# Patient Record
Sex: MALE | Race: WHITE | NOT HISPANIC OR LATINO | Employment: FULL TIME | URBAN - METROPOLITAN AREA
[De-identification: names, ages, dates, MRNs, and addresses within clinical notes are randomized per-mention and may not be internally consistent; named-entity substitution may affect disease eponyms.]

---

## 2025-04-15 ENCOUNTER — APPOINTMENT (OUTPATIENT)
Dept: ULTRASOUND IMAGING | Facility: HOSPITAL | Age: 55
End: 2025-04-15

## 2025-04-15 ENCOUNTER — HOSPITAL ENCOUNTER (EMERGENCY)
Facility: HOSPITAL | Age: 55
Discharge: HOME | End: 2025-04-16
Attending: EMERGENCY MEDICINE

## 2025-04-15 ENCOUNTER — APPOINTMENT (OUTPATIENT)
Dept: CT IMAGING | Facility: HOSPITAL | Age: 55
End: 2025-04-15

## 2025-04-15 VITALS
WEIGHT: 235.01 LBS | RESPIRATION RATE: 16 BRPM | BODY MASS INDEX: 29.22 KG/M2 | HEIGHT: 75 IN | DIASTOLIC BLOOD PRESSURE: 95 MMHG | OXYGEN SATURATION: 97 % | HEART RATE: 85 BPM | TEMPERATURE: 97.7 F | SYSTOLIC BLOOD PRESSURE: 147 MMHG

## 2025-04-15 DIAGNOSIS — L02.416 CELLULITIS AND ABSCESS OF LEFT LEG: Primary | ICD-10-CM

## 2025-04-15 DIAGNOSIS — L03.116 CELLULITIS AND ABSCESS OF LEFT LEG: Primary | ICD-10-CM

## 2025-04-15 LAB
ANION GAP SERPL CALC-SCNC: 8 MMOL/L (ref 3–11)
BASOPHILS # BLD AUTO: 0.1 10*3/UL
BASOPHILS NFR BLD AUTO: 1.1 % (ref 0–2)
BUN SERPL-MCNC: 13 MG/DL (ref 7–25)
CALCIUM SERPL-MCNC: 9.2 MG/DL (ref 8.6–10.3)
CHLORIDE SERPL-SCNC: 101 MMOL/L (ref 98–107)
CO2 SERPL-SCNC: 28 MMOL/L (ref 21–32)
CREAT SERPL-MCNC: 1.11 MG/DL (ref 0.7–1.3)
EGFRCR SERPLBLD CKD-EPI 2021: 77 ML/MIN/1.73M*2
EOSINOPHIL # BLD AUTO: 0.5 10*3/UL
EOSINOPHIL NFR BLD AUTO: 4.6 % (ref 0–3)
ERYTHROCYTE [DISTWIDTH] IN BLOOD BY AUTOMATED COUNT: 13.1 % (ref 11.5–15)
GLUCOSE SERPL-MCNC: 103 MG/DL (ref 70–105)
HCT VFR BLD AUTO: 40.4 % (ref 38–50)
HGB BLD-MCNC: 14.1 G/DL (ref 13.2–17.2)
LYMPHOCYTES # BLD AUTO: 2.4 10*3/UL
LYMPHOCYTES NFR BLD AUTO: 23.9 % (ref 15–47)
MAGNESIUM SERPL-MCNC: 1.9 MG/DL (ref 1.8–2.4)
MCH RBC QN AUTO: 28.9 PG (ref 29–34)
MCHC RBC AUTO-ENTMCNC: 34.9 G/DL (ref 32–36)
MCV RBC AUTO: 82.9 FL (ref 82–97)
MONOCYTES # BLD AUTO: 1.1 10*3/UL
MONOCYTES NFR BLD AUTO: 10.8 % (ref 5–13)
NEUTROPHILS # BLD AUTO: 6 10*3/UL
NEUTROPHILS NFR BLD AUTO: 59.6 % (ref 46–70)
PLATELET # BLD AUTO: 248 10*3/UL (ref 130–350)
PMV BLD AUTO: 7.8 FL (ref 6.9–10.8)
POTASSIUM SERPL-SCNC: 3.4 MMOL/L (ref 3.5–5.1)
RBC # BLD AUTO: 4.87 10*6/UL (ref 4.1–5.8)
SODIUM SERPL-SCNC: 137 MMOL/L (ref 135–145)
WBC # BLD AUTO: 10 10*3/UL (ref 3.7–9.6)

## 2025-04-15 PROCEDURE — 6360000200 HC RX 636 W HCPCS (ALT 250 FOR IP): Performed by: EMERGENCY MEDICINE

## 2025-04-15 PROCEDURE — 2550000100 HC RX 255: Performed by: EMERGENCY MEDICINE

## 2025-04-15 PROCEDURE — 96374 THER/PROPH/DIAG INJ IV PUSH: CPT

## 2025-04-15 PROCEDURE — 85025 COMPLETE CBC W/AUTO DIFF WBC: CPT | Performed by: EMERGENCY MEDICINE

## 2025-04-15 PROCEDURE — 96375 TX/PRO/DX INJ NEW DRUG ADDON: CPT

## 2025-04-15 PROCEDURE — 73701 CT LOWER EXTREMITY W/DYE: CPT | Mod: LT

## 2025-04-15 PROCEDURE — 36415 COLL VENOUS BLD VENIPUNCTURE: CPT | Performed by: EMERGENCY MEDICINE

## 2025-04-15 PROCEDURE — 80048 BASIC METABOLIC PNL TOTAL CA: CPT | Performed by: EMERGENCY MEDICINE

## 2025-04-15 PROCEDURE — 83735 ASSAY OF MAGNESIUM: CPT | Performed by: EMERGENCY MEDICINE

## 2025-04-15 PROCEDURE — 2580000300 HC RX 258: Performed by: EMERGENCY MEDICINE

## 2025-04-15 PROCEDURE — 99283 EMERGENCY DEPT VISIT LOW MDM: CPT | Performed by: EMERGENCY MEDICINE

## 2025-04-15 PROCEDURE — 93971 EXTREMITY STUDY: CPT | Mod: LT

## 2025-04-15 RX ORDER — SODIUM CHLORIDE 9 MG/ML
1000 INJECTION, SOLUTION INTRAVENOUS ONCE
Status: COMPLETED | OUTPATIENT
Start: 2025-04-15 | End: 2025-04-16

## 2025-04-15 RX ORDER — CEFTRIAXONE SODIUM 2 G
2000 VIAL (EA) INJECTION ONCE
Status: COMPLETED | OUTPATIENT
Start: 2025-04-15 | End: 2025-04-15

## 2025-04-15 RX ORDER — IOPAMIDOL 755 MG/ML
65 INJECTION, SOLUTION INTRAVASCULAR ONCE
Status: COMPLETED | OUTPATIENT
Start: 2025-04-15 | End: 2025-04-15

## 2025-04-15 RX ORDER — KETOROLAC TROMETHAMINE 15 MG/ML
15 INJECTION, SOLUTION INTRAMUSCULAR; INTRAVENOUS ONCE
Status: COMPLETED | OUTPATIENT
Start: 2025-04-15 | End: 2025-04-15

## 2025-04-15 RX ADMIN — CEFTRIAXONE 2000 MG: 2 INJECTION, POWDER, FOR SOLUTION INTRAMUSCULAR; INTRAVENOUS at 23:05

## 2025-04-15 RX ADMIN — KETOROLAC TROMETHAMINE 15 MG: 15 INJECTION, SOLUTION INTRAMUSCULAR; INTRAVENOUS at 23:05

## 2025-04-15 RX ADMIN — VANCOMYCIN HYDROCHLORIDE 2000 MG: 2 INJECTION, POWDER, LYOPHILIZED, FOR SOLUTION INTRAVENOUS at 23:08

## 2025-04-15 RX ADMIN — IOPAMIDOL 65 ML: 755 INJECTION, SOLUTION INTRAVENOUS at 23:25

## 2025-04-15 RX ADMIN — SODIUM CHLORIDE 1000 ML: 9 INJECTION, SOLUTION INTRAVENOUS at 23:07

## 2025-04-16 PROCEDURE — 10060 I&D ABSCESS SIMPLE/SINGLE: CPT | Performed by: EMERGENCY MEDICINE

## 2025-04-16 RX ORDER — CLINDAMYCIN HCL 300 MG
1 CAPSULE ORAL ONCE
Status: COMPLETED | OUTPATIENT
Start: 2025-04-16 | End: 2025-04-16

## 2025-04-16 RX ADMIN — Medication 1 PACKAGE: at 01:03

## 2025-04-16 NOTE — DISCHARGE INSTRUCTIONS
Take all antibiotics as prescribed.  Keep the leg elevated is much as possible.  Soak your leg in a hot bath with Epsom salt 5-10 times a day for the next 1 to 2 days if you can.  Return to the ER with any new or worsening symptoms.

## 2025-04-16 NOTE — ED PROCEDURE NOTE
Procedure  Abscess I and D    Date/Time: 4/16/2025 12:58 AM    Performed by: Jose Knapp DO  Authorized by: Jose Knapp DO    Consent:     Consent obtained:  Verbal    Consent given by:  Patient    Risks discussed:  Bleeding, incomplete drainage, infection and damage to other organs    Alternatives discussed:  No treatment  Universal protocol:     Procedure explained and questions answered to patient or proxy's satisfaction: yes      Patient identity confirmed:  Verbally with patient and arm band  Location:     Type:  Abscess    Location:  Lower extremity    Lower extremity location:  Leg    Leg location:  L lower leg  Pre-procedure details:     Skin preparation:  Povidone-iodine  Anesthesia:     Anesthesia method:  Local infiltration    Local anesthetic:  Lidocaine 2% WITH epi  Procedure type:     Complexity:  Simple  Procedure details:     Ultrasound guidance: no      Needle aspiration: no      Incision types:  Stab incision    Incision depth:  Dermal    Wound management:  Probed and deloculated and irrigated with saline    Drainage:  Purulent and bloody    Drainage amount:  Scant    Wound treatment:  Wound left open    Packing materials:  None  Post-procedure details:     Procedure completion:  Tolerated well, no immediate complications               Jose Knpap DO  04/16/25 0059

## 2025-04-16 NOTE — ED PROVIDER NOTES
HPI:  Kp Santillan is a 55 y.o. male who presents to the ED for left leg pain and swelling.  He is currently here visiting oncology here with his daughter originally from [place].  He had some biopsies done recently on the back of his left calf by a dermatologist.  He has developed a abscess.  He went to a clinic in [place] last week had a incision and drainage was placed on Bactrim ointment and Keflex.  He states his got much worse more painful and swollen.  No fevers vomiting or other complaints.  He is not diabetic.  Does have a history of hypertension on losartan.          ROS:  As per the HPI, 10+ systems reviewed as negative.     PAST MEDICAL HISTORY:  No past medical history on file.        SURGICAL HISTORY:  No past surgical history on file.     SOCIAL HISTORY:  Positive for:        [ ]  Alcohol        [ ] Tobacco        [ ]  Illicit drugs          FAMILY HISTORY:  No family history on file.      Medical, Surgical, Family and Social History were reviewed as documented above.     HOME MEDICATIONS:  No current outpatient medications on file.     ALLERGIES:  No Known Allergies          Physical Exam:    Vitals:  ED Triage Vitals   Temp Heart Rate Resp BP SpO2   04/15/25 2111 04/15/25 2108 04/15/25 2111 04/15/25 2111 04/15/25 2108   36.5 °C (97.7 °F) 95 16 155/99 97 %      Mean BP (mmHg) Temp Source Heart Rate Source Patient Position BP Location   04/15/25 2111 04/15/25 2111 -- -- --   115 Oral         FiO2 (%)       --                    General: Vital Signs Reviewed. No acute Distress. Oriented to person, place, and time. Appears well-developed and well-nourished.  HEENT: Normocephalic, atraumatic. Pupils PERRL. Conjunctivae and EOM normal. Oropharynx without erythema, swelling, or exudate.  Mucous membranes moist.  Neck: Neck supple. Full ROM.  Resp/Chest: Lungs clear to auscultation bilaterally. Normal effort, No wheezes or rales.  Cardio: Heart tones normal. RRR.  No murmurs rubs or gallops.  Abdomen: Abdomen  is soft, nontender, no rebound, no guarding.  Neuro: Strength and sensation are symmetric and grossly normal.  Musculoskeletal: Left leg mildly swollen erythematous from just above the knee down.  He does have a abscess on the posterior aspect of the left thigh with notable surrounding induration and small area of fluctuance, distally neurovascular intact in the left lower extremity.  SKIN: Skin is warm, dry, normal color. No rash, not diaphoretic.     Results:    Labs Reviewed   CBC WITH AUTO DIFFERENTIAL - Abnormal       Result Value    WBC 10.0 (*)     RBC 4.87      Hemoglobin 14.1      Hematocrit 40.4      MCV 82.9      MCH 28.9 (*)     MCHC 34.9      RDW 13.1      Platelets 248      MPV 7.8      Neutrophils% 59.6      Lymphocytes% 23.9      Monocytes% 10.8      Eosinophils% 4.6 (*)     Basophils% 1.1      ANC (auto diff) 6.00      Lymphocytes Absolute 2.40      Monocytes Absolute 1.10      Eosinophils Absolute 0.50      Basophils Absolute 0.10     BASIC METABOLIC PANEL - Abnormal    Sodium 137      Potassium 3.4 (*)     Chloride 101      CO2 28      BUN 13      Creatinine 1.11      Glucose 103      Calcium 9.2      Anion Gap 8      eGFR 77      Narrative:     Calculation based on the 2021 Chronic Kidney Disease Epidemiology Collaboration (CKD-EPI) equation refit without adjustment for race.   MAGNESIUM - Normal    Magnesium 1.9          CT lower extremity left with IV contrast   Preliminary Result   IMPRESSION:   1. Soft tissue edema involving the posterolateral aspect of the left lower    extremity above the knee. The edema becomes circumferential    below-the-knee. Correlate for cellulitis.   2. No loculated drainable collections identified.      US Venous duplex lower extremity left   Preliminary Result   IMPRESSION:   1. No evidence of deep vein thrombosis.   2. Noncompressible superficial vein on the upper posterior calf without    evidence of flow-limiting stenosis.               Medical Decision  Making:  Very well-appearing 55-year-old male who is currently traveling here from [place] here with cellulitis of the left leg with swelling of the left leg.  I did get a DVT ultrasound and no evidence of DVT.  A CT of the lower extremity was obtained to evaluate for possible deeper abscess formation however no significant deep abscess.  He did have a small superficial abscess.  Did give him empiric antibiotics with Rocephin and vancomycin.  He is already failed a course of Keflex however obviously does not cover for MRSA.  I do not think he needs to be admitted I did I&D of the small abscess without any complication and was left open.  Will provide a prescription for clindamycin.  Recommend keeping his leg elevated and soaking the leg in hot water with Epsom salt for the next couple days.  Close follow-up with his doctor when he returns home with return precautions given.  He has multiple chronic no conditions affecting Medical Decision Making.  I did independently review his imaging studies.     Financial and social constraints, proximity to healthcare facilities, and comorbidities were considered in the treatment plan for this patient.      Impression:  1. Cellulitis and abscess of left leg            Disposition:  Discharge      A voice recognition program was used to aid in documentation of this record.  Sometimes words are not printed exactly as they were spoken.  While efforts were made to carefully edit and correct any inaccuracies, some areas may be present; please take these into context.  Please contact the provider if areas are identified.       Jose Knapp, DO  04/16/25 0100